# Patient Record
Sex: MALE | Race: ASIAN | NOT HISPANIC OR LATINO | Employment: UNEMPLOYED | URBAN - METROPOLITAN AREA
[De-identification: names, ages, dates, MRNs, and addresses within clinical notes are randomized per-mention and may not be internally consistent; named-entity substitution may affect disease eponyms.]

---

## 2023-06-16 ENCOUNTER — OFFICE VISIT (OUTPATIENT)
Dept: FAMILY MEDICINE | Facility: CLINIC | Age: 38
End: 2023-06-16
Payer: MEDICAID

## 2023-06-16 VITALS
HEIGHT: 63 IN | OXYGEN SATURATION: 99 % | SYSTOLIC BLOOD PRESSURE: 127 MMHG | RESPIRATION RATE: 18 BRPM | BODY MASS INDEX: 29.23 KG/M2 | WEIGHT: 165 LBS | DIASTOLIC BLOOD PRESSURE: 83 MMHG | HEART RATE: 102 BPM | TEMPERATURE: 98.7 F

## 2023-06-16 DIAGNOSIS — K92.1 MELENA: Primary | ICD-10-CM

## 2023-06-16 DIAGNOSIS — Z11.59 NEED FOR HEPATITIS C SCREENING TEST: ICD-10-CM

## 2023-06-16 DIAGNOSIS — Z11.4 SCREENING FOR HIV (HUMAN IMMUNODEFICIENCY VIRUS): ICD-10-CM

## 2023-06-16 DIAGNOSIS — R04.0 EPISTAXIS: ICD-10-CM

## 2023-06-16 LAB
ALBUMIN SERPL BCG-MCNC: 4.6 G/DL (ref 3.5–5.2)
ALP SERPL-CCNC: 70 U/L (ref 40–129)
ALT SERPL W P-5'-P-CCNC: 32 U/L (ref 0–70)
ANION GAP SERPL CALCULATED.3IONS-SCNC: 12 MMOL/L (ref 7–15)
AST SERPL W P-5'-P-CCNC: 35 U/L (ref 0–45)
BASOPHILS # BLD AUTO: 0.1 10E3/UL (ref 0–0.2)
BASOPHILS NFR BLD AUTO: 1 %
BILIRUB SERPL-MCNC: 0.2 MG/DL
BUN SERPL-MCNC: 10.9 MG/DL (ref 6–20)
CALCIUM SERPL-MCNC: 9.3 MG/DL (ref 8.6–10)
CHLORIDE SERPL-SCNC: 104 MMOL/L (ref 98–107)
CHOLEST SERPL-MCNC: 194 MG/DL
CREAT SERPL-MCNC: 0.76 MG/DL (ref 0.67–1.17)
DEPRECATED HCO3 PLAS-SCNC: 24 MMOL/L (ref 22–29)
EOSINOPHIL # BLD AUTO: 0.1 10E3/UL (ref 0–0.7)
EOSINOPHIL NFR BLD AUTO: 2 %
ERYTHROCYTE [DISTWIDTH] IN BLOOD BY AUTOMATED COUNT: 22 % (ref 10–15)
GFR SERPL CREATININE-BSD FRML MDRD: >90 ML/MIN/1.73M2
GLUCOSE SERPL-MCNC: 108 MG/DL (ref 70–99)
HCT VFR BLD AUTO: 23.6 % (ref 40–53)
HDLC SERPL-MCNC: 33 MG/DL
HGB BLD-MCNC: 6.3 G/DL (ref 13.3–17.7)
IMM GRANULOCYTES # BLD: 0 10E3/UL
IMM GRANULOCYTES NFR BLD: 0 %
INR PPP: 1.06 (ref 0.85–1.15)
LDLC SERPL CALC-MCNC: 89 MG/DL
LYMPHOCYTES # BLD AUTO: 1.3 10E3/UL (ref 0.8–5.3)
LYMPHOCYTES NFR BLD AUTO: 28 %
MCH RBC QN AUTO: 17.7 PG (ref 26.5–33)
MCHC RBC AUTO-ENTMCNC: 26.7 G/DL (ref 31.5–36.5)
MCV RBC AUTO: 66 FL (ref 78–100)
MONOCYTES # BLD AUTO: 0.6 10E3/UL (ref 0–1.3)
MONOCYTES NFR BLD AUTO: 13 %
NEUTROPHILS # BLD AUTO: 2.6 10E3/UL (ref 1.6–8.3)
NEUTROPHILS NFR BLD AUTO: 56 %
NONHDLC SERPL-MCNC: 161 MG/DL
PLATELET # BLD AUTO: 378 10E3/UL (ref 150–450)
POTASSIUM SERPL-SCNC: 4.1 MMOL/L (ref 3.4–5.3)
PROT SERPL-MCNC: 8.2 G/DL (ref 6.4–8.3)
RBC # BLD AUTO: 3.56 10E6/UL (ref 4.4–5.9)
SODIUM SERPL-SCNC: 140 MMOL/L (ref 136–145)
TRIGL SERPL-MCNC: 360 MG/DL
WBC # BLD AUTO: 4.7 10E3/UL (ref 4–11)

## 2023-06-16 PROCEDURE — 86706 HEP B SURFACE ANTIBODY: CPT | Performed by: STUDENT IN AN ORGANIZED HEALTH CARE EDUCATION/TRAINING PROGRAM

## 2023-06-16 PROCEDURE — 36415 COLL VENOUS BLD VENIPUNCTURE: CPT | Performed by: STUDENT IN AN ORGANIZED HEALTH CARE EDUCATION/TRAINING PROGRAM

## 2023-06-16 PROCEDURE — 85025 COMPLETE CBC W/AUTO DIFF WBC: CPT | Performed by: STUDENT IN AN ORGANIZED HEALTH CARE EDUCATION/TRAINING PROGRAM

## 2023-06-16 PROCEDURE — 80061 LIPID PANEL: CPT | Performed by: STUDENT IN AN ORGANIZED HEALTH CARE EDUCATION/TRAINING PROGRAM

## 2023-06-16 PROCEDURE — 85610 PROTHROMBIN TIME: CPT | Performed by: STUDENT IN AN ORGANIZED HEALTH CARE EDUCATION/TRAINING PROGRAM

## 2023-06-16 PROCEDURE — 80053 COMPREHEN METABOLIC PANEL: CPT | Performed by: STUDENT IN AN ORGANIZED HEALTH CARE EDUCATION/TRAINING PROGRAM

## 2023-06-16 PROCEDURE — T1013 SIGN LANG/ORAL INTERPRETER: HCPCS | Performed by: STUDENT IN AN ORGANIZED HEALTH CARE EDUCATION/TRAINING PROGRAM

## 2023-06-16 PROCEDURE — 99204 OFFICE O/P NEW MOD 45 MIN: CPT | Mod: GC | Performed by: STUDENT IN AN ORGANIZED HEALTH CARE EDUCATION/TRAINING PROGRAM

## 2023-06-16 PROCEDURE — 86803 HEPATITIS C AB TEST: CPT | Performed by: STUDENT IN AN ORGANIZED HEALTH CARE EDUCATION/TRAINING PROGRAM

## 2023-06-16 PROCEDURE — 87389 HIV-1 AG W/HIV-1&-2 AB AG IA: CPT | Performed by: STUDENT IN AN ORGANIZED HEALTH CARE EDUCATION/TRAINING PROGRAM

## 2023-06-16 NOTE — PROGRESS NOTES
"Preceptor attestation:  Vital signs reviewed: /83 (BP Location: Left arm, Patient Position: Sitting, Cuff Size: Adult Regular)   Pulse 102   Temp 98.7  F (37.1  C) (Tympanic)   Resp 18   Ht 1.6 m (5' 3\")   Wt 74.8 kg (165 lb)   SpO2 99%   BMI 29.23 kg/m      Patient seen, evaluated, and discussed with the resident.  I have verified the content of the note, which accurately reflects my assessment of the patient and the plan of care.    Supervising physician: Ana Maria Hoskins MD  [facility]  "

## 2023-06-16 NOTE — PROGRESS NOTES
Assessment & Plan     Screening for HIV (human immunodeficiency virus)  Establishing care with patient. Has no prior medical records. Episode of scleral icterus and skin yellowing, epistaxis, and melena of unknown origin 1 month ago.   - HIV Screening; Future  - HIV Screening    Need for hepatitis C screening test  Establishing care with patient. Has no prior medical records. Episode of scleral icterus and skin yellowing, epistaxis, melena, and fever of unknown origin 1 month ago. Patient does not know if he has been vaccinated or ever had a diagnosis of Hepatitis. No abdominal pain or hepatomegaly on exam.     Melena  Reports 2 weeks of dark brown blood in stool with episode of epistaxis and scleral icterus and skin yellowing. Was drinking hard alcohol before onset of symptoms. Not currently bleeding. No abdominal pain or hepatomegaly on exam.   - HIV Screening; Future  - Hepatitis C Screen Reflex to HCV RNA Quant and Genotype; Future  - Lipid panel reflex to direct LDL Non-fasting; Future  - CBC with Platelets & Differential; Future  - Comprehensive metabolic panel; Future  - Hepatitis B Surface Antibody; Future  - INR; Future  - HIV Screening  - Hepatitis C Screen Reflex to HCV RNA Quant and Genotype  - Lipid panel reflex to direct LDL Non-fasting  - CBC with Platelets & Differential  - Comprehensive metabolic panel  - Hepatitis B Surface Antibody  - INR    Epistaxis  Reports 1 month of epistaxis associated with scleral icterus and yellowing of the skin and melena of unknown origin. Was drinking hard alcohol prior to incident. No prior medical history and has not regularly sought care elsewhere. No abdominal pain or hepatomegaly on exam.   - HIV Screening; Future  - Hepatitis C Screen Reflex to HCV RNA Quant and Genotype; Future  - Lipid panel reflex to direct LDL Non-fasting; Future  - CBC with Platelets & Differential; Future  - Comprehensive metabolic panel; Future  - Hepatitis B Surface Antibody; Future  -  INR; Future  - HIV Screening  - Hepatitis C Screen Reflex to HCV RNA Quant and Genotype  - Lipid panel reflex to direct LDL Non-fasting  - CBC with Platelets & Differential  - Comprehensive metabolic panel  - Hepatitis B Surface Antibody  - INR    Addendum: Critical lab returned for patient Hgb 6.3, called patient over the phone with results and discussed that he should present to the emergency room. Patient would prefer to go to [facility] in [place] as he lives closest to there.    FUTURE APPOINTMENTS:       - Follow-up visit on June 21st, 2023 at 1:50 pm to go over lab work     Return in about 2 weeks (around 6/30/2023).    Madelnie Ashley, MS-3  Resident/Fellow Attestation   I, Go Diaz MD, was present with the medical/BARTOLO student who participated in the service and in the documentation of the note.  I have verified the history and personally performed the physical exam and medical decision making.  I agree with the assessment and plan of care as documented in the note.      Go Diaz MD  PGY3      Go Diaz MD  [facility]    Subjective   Lanette is a 37 year old, presenting for the following health issues:  RECHECK (Follow up headache and dizziness)         View : No data to display.              HPI Lanette is a 37 year old male here for dizziness. This started 1 month ago with a bloody nose and his skin and conjunctiva turning yellow. He also had a fever at that time. Prior to this, he had been drinking a lot of hard alcohol. Has not drinken since. He did not seek medical care at that time. His skin and eyes have gone back to their normal coloring but he is still getting dizzy at times with certain scents. No history of hepatitis. Unsure if he has been vaccinated against Hepatitis. Also reports a 2 week history of blood in the stool, dark red. Not currently bleeding.     No chills, shortness of breathe or abdominal pain. Some chest tightness with activity that is  "relieved by rest. Muscle aches after long bouts of activity in his legs.     This is the patients first time being seen here at the [facility]. He has not established care or had consistent medical care elsewhere. He has no known prior diagnoses. He does not take any medications.     Dizziness  Onset/Duration: 1 month ago bloody nose, skin turned yellow, HA and dizziness. Did not seek medical care. Now still experiencing HA and dizziness. Scleral icterus. Fever. No history of hepatitis. Only happens with scents--especially with cooking.       Description:   Do you feel faint: YES-- problems balancing   Does it feel like the surroundings (bed, room) are moving: No  Unsteady/off balance: No  Have you passed out or fallen: No  Progression of Symptoms: improving  Accompanying Signs & Symptoms:  Heart palpitations or chest pain: YES- feels chest tightness   Nausea, vomiting: No  Weakness or lack of coordination in arms or legs: Yes   Vision or speech changes: No  Numbness or tingling: No  Ringing in ears (Tinnitus): YES- in the morning when he wakes up, doesn't think related to dizziness   Hearing Loss: No  History:    Head trauma/concussion history: No  Previous similar symptoms: No  Recent bleeding history: YES- bloody nose & bowel movement (for 2 weeks now, dark red color)   Any new medications (BP?): No  Precipitating factors:   Worse with activity: No  Worse with head movement: No  Alleviating factors:   Does staying in a fixed position give relief: N/A   Therapies tried and outcome: \"traditional smell\"     Review of Systems   Constitutional, HEENT, cardiovascular, pulmonary, GI, , musculoskeletal, neuro, skin, endocrine and psych systems are negative, except as otherwise noted.      Objective    /83 (BP Location: Left arm, Patient Position: Sitting, Cuff Size: Adult Regular)   Pulse 102   Temp 98.7  F (37.1  C) (Tympanic)   Resp 18   Ht 1.6 m (5' 3\")   Wt 74.8 kg (165 lb)   SpO2 99%   BMI 29.23 " kg/m    Body mass index is 29.23 kg/m .  Physical Exam   GENERAL: healthy, alert and no distress  EYES: Eyes grossly normal to inspection, PERRL and conjunctivae and sclerae normal, no scleral icterus   RESP: lungs clear to auscultation - no rales, rhonchi or wheezes  CV: regular rate and rhythm, normal S1 S2, no S3 or S4, no murmur, click or rub, no peripheral edema and peripheral pulses strong  ABDOMEN: soft, nontender, no hepatosplenomegaly, no masses and bowel sounds normal  MS: no gross musculoskeletal defects noted, no edema  SKIN: no suspicious lesions or rashes, no yellowing of the skin   NEURO: Normal strength and tone, mentation intact and speech normal  PSYCH: mentation appears normal, affect normal/bright    Results for orders placed or performed in visit on 06/16/23 (from the past 24 hour(s))   CBC with Platelets & Differential    Narrative    The following orders were created for panel order CBC with Platelets & Differential.  Procedure                               Abnormality         Status                     ---------                               -----------         ------                     CBC with platelets and d...[[id number]]  Abnormal            Final result                 Please view results for these tests on the individual orders.   CBC with platelets and differential   Result Value Ref Range    WBC Count 4.7 4.0 - 11.0 10e3/uL    RBC Count 3.56 (L) 4.40 - 5.90 10e6/uL    Hemoglobin 6.3 (LL) 13.3 - 17.7 g/dL    Hematocrit 23.6 (L) 40.0 - 53.0 %    MCV 66 (L) 78 - 100 fL    MCH 17.7 (L) 26.5 - 33.0 pg    MCHC 26.7 (L) 31.5 - 36.5 g/dL    RDW 22.0 (H) 10.0 - 15.0 %    Platelet Count 378 150 - 450 10e3/uL    % Neutrophils 56 %    % Lymphocytes 28 %    % Monocytes 13 %    % Eosinophils 2 %    % Basophils 1 %    % Immature Granulocytes 0 %    Absolute Neutrophils 2.6 1.6 - 8.3 10e3/uL    Absolute Lymphocytes 1.3 0.8 - 5.3 10e3/uL    Absolute Monocytes 0.6 0.0 - 1.3 10e3/uL    Absolute  Eosinophils 0.1 0.0 - 0.7 10e3/uL    Absolute Basophils 0.1 0.0 - 0.2 10e3/uL    Absolute Immature Granulocytes 0.0 <=0.4 10e3/uL       ----- Service Performed and Documented by Resident or Fellow ------

## 2023-06-17 LAB
HBV SURFACE AB SERPL IA-ACNC: >1000 M[IU]/ML
HBV SURFACE AB SERPL IA-ACNC: REACTIVE M[IU]/ML
HCV AB SERPL QL IA: NONREACTIVE
HIV 1+2 AB+HIV1 P24 AG SERPL QL IA: NONREACTIVE

## 2023-06-21 ENCOUNTER — HOSPITAL ENCOUNTER (OUTPATIENT)
Facility: HOSPITAL | Age: 38
Setting detail: OBSERVATION
Discharge: HOME | End: 2023-06-22
Attending: EMERGENCY MEDICINE | Admitting: FAMILY MEDICINE
Payer: MEDICAID

## 2023-06-21 ENCOUNTER — OFFICE VISIT (OUTPATIENT)
Dept: FAMILY MEDICINE | Facility: CLINIC | Age: 38
End: 2023-06-21
Payer: MEDICAID

## 2023-06-21 VITALS
SYSTOLIC BLOOD PRESSURE: 114 MMHG | RESPIRATION RATE: 16 BRPM | OXYGEN SATURATION: 99 % | HEART RATE: 88 BPM | BODY MASS INDEX: 28.87 KG/M2 | WEIGHT: 163 LBS | TEMPERATURE: 98.4 F | DIASTOLIC BLOOD PRESSURE: 76 MMHG

## 2023-06-21 DIAGNOSIS — K92.1 MELENA: ICD-10-CM

## 2023-06-21 DIAGNOSIS — D64.9 SEVERE ANEMIA: Primary | ICD-10-CM

## 2023-06-21 DIAGNOSIS — D64.9 ANEMIA, UNSPECIFIED TYPE: ICD-10-CM

## 2023-06-21 LAB
ABO/RH(D): NORMAL
ALBUMIN SERPL BCG-MCNC: 4.4 G/DL (ref 3.5–5.2)
ALP SERPL-CCNC: 71 U/L (ref 40–129)
ALT SERPL W P-5'-P-CCNC: 26 U/L (ref 0–70)
ANION GAP SERPL CALCULATED.3IONS-SCNC: 11 MMOL/L (ref 7–15)
ANTIBODY SCREEN: NEGATIVE
AST SERPL W P-5'-P-CCNC: 24 U/L (ref 0–45)
BASOPHILS # BLD AUTO: 0.1 10E3/UL (ref 0–0.2)
BASOPHILS # BLD AUTO: 0.1 10E3/UL (ref 0–0.2)
BASOPHILS NFR BLD AUTO: 1 %
BASOPHILS NFR BLD AUTO: 1 %
BILIRUB SERPL-MCNC: 0.3 MG/DL
BLD PROD TYP BPU: NORMAL
BLOOD COMPONENT TYPE: NORMAL
BUN SERPL-MCNC: 16.7 MG/DL (ref 6–20)
CALCIUM SERPL-MCNC: 9.6 MG/DL (ref 8.6–10)
CHLORIDE SERPL-SCNC: 103 MMOL/L (ref 98–107)
CODING SYSTEM: NORMAL
CREAT SERPL-MCNC: 1.05 MG/DL (ref 0.67–1.17)
CROSSMATCH: NORMAL
DEPRECATED HCO3 PLAS-SCNC: 24 MMOL/L (ref 22–29)
EOSINOPHIL # BLD AUTO: 0.1 10E3/UL (ref 0–0.7)
EOSINOPHIL # BLD AUTO: 0.1 10E3/UL (ref 0–0.7)
EOSINOPHIL NFR BLD AUTO: 2 %
EOSINOPHIL NFR BLD AUTO: 2 %
ERYTHROCYTE [DISTWIDTH] IN BLOOD BY AUTOMATED COUNT: 22.7 % (ref 10–15)
ERYTHROCYTE [DISTWIDTH] IN BLOOD BY AUTOMATED COUNT: 23.3 % (ref 10–15)
GFR SERPL CREATININE-BSD FRML MDRD: >90 ML/MIN/1.73M2
GLUCOSE SERPL-MCNC: 138 MG/DL (ref 70–99)
HCT VFR BLD AUTO: 23.7 % (ref 40–53)
HCT VFR BLD AUTO: 25.3 % (ref 40–53)
HGB BLD-MCNC: 6.1 G/DL (ref 13.3–17.7)
HGB BLD-MCNC: 6.5 G/DL (ref 13.3–17.7)
IMM GRANULOCYTES # BLD: 0 10E3/UL
IMM GRANULOCYTES # BLD: 0 10E3/UL
IMM GRANULOCYTES NFR BLD: 0 %
IMM GRANULOCYTES NFR BLD: 0 %
ISSUE DATE AND TIME: NORMAL
LYMPHOCYTES # BLD AUTO: 1.4 10E3/UL (ref 0.8–5.3)
LYMPHOCYTES # BLD AUTO: 1.7 10E3/UL (ref 0.8–5.3)
LYMPHOCYTES NFR BLD AUTO: 24 %
LYMPHOCYTES NFR BLD AUTO: 26 %
MCH RBC QN AUTO: 17.1 PG (ref 26.5–33)
MCH RBC QN AUTO: 17.3 PG (ref 26.5–33)
MCHC RBC AUTO-ENTMCNC: 25.7 G/DL (ref 31.5–36.5)
MCHC RBC AUTO-ENTMCNC: 25.7 G/DL (ref 31.5–36.5)
MCV RBC AUTO: 67 FL (ref 78–100)
MCV RBC AUTO: 68 FL (ref 78–100)
MONOCYTES # BLD AUTO: 0.7 10E3/UL (ref 0–1.3)
MONOCYTES # BLD AUTO: 0.9 10E3/UL (ref 0–1.3)
MONOCYTES NFR BLD AUTO: 11 %
MONOCYTES NFR BLD AUTO: 14 %
NEUTROPHILS # BLD AUTO: 3.7 10E3/UL (ref 1.6–8.3)
NEUTROPHILS # BLD AUTO: 3.8 10E3/UL (ref 1.6–8.3)
NEUTROPHILS NFR BLD AUTO: 57 %
NEUTROPHILS NFR BLD AUTO: 62 %
NRBC # BLD AUTO: 0 10E3/UL
NRBC # BLD AUTO: 0 10E3/UL
NRBC BLD AUTO-RTO: 0 /100
NRBC BLD AUTO-RTO: 0 /100
PLATELET # BLD AUTO: 358 10E3/UL (ref 150–450)
PLATELET # BLD AUTO: 392 10E3/UL (ref 150–450)
POTASSIUM SERPL-SCNC: 3.9 MMOL/L (ref 3.4–5.3)
PROT SERPL-MCNC: 7.9 G/DL (ref 6.4–8.3)
RBC # BLD AUTO: 3.56 10E6/UL (ref 4.4–5.9)
RBC # BLD AUTO: 3.75 10E6/UL (ref 4.4–5.9)
SODIUM SERPL-SCNC: 138 MMOL/L (ref 136–145)
SPECIMEN EXPIRATION DATE: NORMAL
UNIT ABO/RH: NORMAL
UNIT NUMBER: NORMAL
UNIT STATUS: NORMAL
UNIT TYPE ISBT: 9500
WBC # BLD AUTO: 6 10E3/UL (ref 4–11)
WBC # BLD AUTO: 6.7 10E3/UL (ref 4–11)

## 2023-06-21 PROCEDURE — 99214 OFFICE O/P EST MOD 30 MIN: CPT | Mod: GC | Performed by: STUDENT IN AN ORGANIZED HEALTH CARE EDUCATION/TRAINING PROGRAM

## 2023-06-21 PROCEDURE — 80053 COMPREHEN METABOLIC PANEL: CPT | Performed by: EMERGENCY MEDICINE

## 2023-06-21 PROCEDURE — 36430 TRANSFUSION BLD/BLD COMPNT: CPT

## 2023-06-21 PROCEDURE — 99285 EMERGENCY DEPT VISIT HI MDM: CPT | Mod: 25

## 2023-06-21 PROCEDURE — 36415 COLL VENOUS BLD VENIPUNCTURE: CPT | Performed by: EMERGENCY MEDICINE

## 2023-06-21 PROCEDURE — G0378 HOSPITAL OBSERVATION PER HR: HCPCS

## 2023-06-21 PROCEDURE — 85018 HEMOGLOBIN: CPT | Mod: 91 | Performed by: STUDENT IN AN ORGANIZED HEALTH CARE EDUCATION/TRAINING PROGRAM

## 2023-06-21 PROCEDURE — 36415 COLL VENOUS BLD VENIPUNCTURE: CPT | Performed by: STUDENT IN AN ORGANIZED HEALTH CARE EDUCATION/TRAINING PROGRAM

## 2023-06-21 PROCEDURE — 86901 BLOOD TYPING SEROLOGIC RH(D): CPT | Performed by: EMERGENCY MEDICINE

## 2023-06-21 PROCEDURE — 85025 COMPLETE CBC W/AUTO DIFF WBC: CPT | Performed by: STUDENT IN AN ORGANIZED HEALTH CARE EDUCATION/TRAINING PROGRAM

## 2023-06-21 PROCEDURE — 250N000011 HC RX IP 250 OP 636: Mod: JZ | Performed by: EMERGENCY MEDICINE

## 2023-06-21 PROCEDURE — 96375 TX/PRO/DX INJ NEW DRUG ADDON: CPT

## 2023-06-21 PROCEDURE — C9113 INJ PANTOPRAZOLE SODIUM, VIA: HCPCS | Mod: JZ | Performed by: EMERGENCY MEDICINE

## 2023-06-21 PROCEDURE — 86923 COMPATIBILITY TEST ELECTRIC: CPT | Performed by: EMERGENCY MEDICINE

## 2023-06-21 PROCEDURE — 85025 COMPLETE CBC W/AUTO DIFF WBC: CPT | Performed by: EMERGENCY MEDICINE

## 2023-06-21 PROCEDURE — P9016 RBC LEUKOCYTES REDUCED: HCPCS | Performed by: EMERGENCY MEDICINE

## 2023-06-21 PROCEDURE — 86850 RBC ANTIBODY SCREEN: CPT | Performed by: EMERGENCY MEDICINE

## 2023-06-21 RX ORDER — ONDANSETRON 2 MG/ML
4 INJECTION INTRAMUSCULAR; INTRAVENOUS EVERY 6 HOURS PRN
Status: DISCONTINUED | OUTPATIENT
Start: 2023-06-21 | End: 2023-06-22 | Stop reason: HOSPADM

## 2023-06-21 RX ORDER — LIDOCAINE 40 MG/G
CREAM TOPICAL
Status: DISCONTINUED | OUTPATIENT
Start: 2023-06-21 | End: 2023-06-22 | Stop reason: HOSPADM

## 2023-06-21 RX ORDER — ONDANSETRON 4 MG/1
4 TABLET, ORALLY DISINTEGRATING ORAL EVERY 6 HOURS PRN
Status: DISCONTINUED | OUTPATIENT
Start: 2023-06-21 | End: 2023-06-22 | Stop reason: HOSPADM

## 2023-06-21 RX ORDER — LIDOCAINE 40 MG/G
CREAM TOPICAL
Status: DISCONTINUED | OUTPATIENT
Start: 2023-06-21 | End: 2023-06-22

## 2023-06-21 RX ADMIN — PANTOPRAZOLE SODIUM 80 MG: 40 INJECTION, POWDER, FOR SOLUTION INTRAVENOUS at 20:50

## 2023-06-21 ASSESSMENT — ACTIVITIES OF DAILY LIVING (ADL)
ADLS_ACUITY_SCORE: 35
ADLS_ACUITY_SCORE: 33
ADLS_ACUITY_SCORE: 35
ADLS_ACUITY_SCORE: 35

## 2023-06-21 ASSESSMENT — ENCOUNTER SYMPTOMS
BLOOD IN STOOL: 0
ABDOMINAL PAIN: 0
LIGHT-HEADEDNESS: 1
FATIGUE: 1

## 2023-06-21 NOTE — ED TRIAGE NOTES
Patient comes in via clinic for low hbg. Patient reports feeling very tried and run down. Patient is Mikayla speaking and speaks no english.

## 2023-06-21 NOTE — LETTER
RETURN TO WORK/SCHOOL FORM    6/21/2023    Re: Lanette Subramanian  1985      To Whom It May Concern:     Lanette Subramanian contacted the clinic today and reported illness and has severe anemia. I recommended that he present to the Emergency Department today see note.      Go Diaz MD  6/21/2023 2:19 PM

## 2023-06-21 NOTE — ED PROVIDER NOTES
EMERGENCY DEPARTMENT ENCOUNTER      NAME: Lanette Subramanian  AGE: 37 year old male  YOB: 1985  MRN: [id number]  EVALUATION DATE & TIME: 2023  4:23 PM    PCP: Ruby Kimbrough    ED PROVIDER: Delmis Dean DO      Chief Complaint   Patient presents with     Abnormal Labs         FINAL IMPRESSION:  1. Anemia, unspecified type    2. Melena          ED COURSE & MEDICAL DECISION MAKIN-year-old male was sent into the ED for evaluation of anemia and melena.  The patient was found to be hemoglobin of 6.3 on 2023.  The patient was instructed to be seen in the ED at that time.,  The patient did not arrive until today.  The patient reports he said history of melanoma.  However, he states that the melena has since resolved over the last few weeks.  The patient reports feeling fatigued and dizzy at times.  The patient had no reports of any abdominal pain, abnormal bleeding or bruising, or any other symptoms.  Here in the ED the patient's heart rate was 100 upon arrival.  Patient was otherwise hemodynamically stable.  He did not appear to be in any obvious distress or discomfort and he was not acutely ill-appearing at the time of his initial evaluation.  The patient's physical exam was unremarkable.       Patient's hemoglobin today in the ED was 6.1.  Remainder of the CBC as well as the CMP were both unremarkable.  The patient was typed and screened and a unit of packed red blood cells were ordered after consent was obtained.    The patient was transfused 1 unit of packed red blood cells here in the ED.  The patient's case was discussed with the on-call gastroenterologist who recommended admission for upper endoscopy in the a.m.       With the help of the Mikayla  the patient was informed that he will be admitted and will likely undergo endoscopy in the a.m.      Pertinent Labs & Imaging studies reviewed. (See chart for details)  4:43 PM I met with the patient to gather history and to perform my initial  exam. We discussed plans for the ED course, including diagnostic testing and treatment.   7:15 PM Discussed the case with Dr. Mcnamara with [facility], who recommends admitting the patient and that they will see him tomorrow and likely conduct an upper endoscopy.   7:33 PM I rechecked and updated the patient, using , on results and plan of admission. Patient is agreeable at this time.       At the conclusion of the encounter I discussed the results of all of the tests and the disposition. The questions were answered. The patient or family acknowledged understanding and was agreeable with the care plan.     Medical Decision Making    History:    Supplemental history from: Documented in chart, if applicable    External Record(s) reviewed: Documented in chart, if applicable., Outpatient Record: Office visits to PCP on 06/16 and 06/21  and Prior Labs: Labs from both PCP visits    Work Up:    Chart documentation includes differential considered and any EKGs or imaging independently interpreted by provider, where specified.    In additional to work up documented, I considered the following work up: Documented in chart, if applicable.    External consultation:    Discussion of management with another provider: Documented in chart, if applicable    Complicating factors:    Care impacted by chronic illness: N/A    Care affected by social determinants of health: N/A    Disposition considerations: Admit.        Critical Care  Performed by: Delmis Dean DO  Authorized by: Delmis Dean DO     Total critical care time: 30 minutes  Critical care time was exclusive of separately billable procedures and treating other patients.  Critical care was necessary to treat or prevent imminent or life-threatening deterioration of the following conditions: Anemia requiring blood transfusion  Critical care was time spent personally by me on the following activities: development of treatment plan with patient or surrogate, discussions with  consultants, examination of patient, evaluation of patient's response to treatment, obtaining history from patient or surrogate, ordering and performing treatments and interventions, ordering and review of laboratory studies, ordering and review of radiographic studies and re-evaluation of patient's condition, this excludes any separately billable procedures.    PPE worn: n95 mask, goggles    MEDICATIONS GIVEN IN THE EMERGENCY:  Medications   lidocaine 1 % 0.1-1 mL (has no administration in time range)   lidocaine (LMX4) cream (has no administration in time range)   sodium chloride (PF) 0.9% PF flush 3 mL (3 mLs Intracatheter $Given 6/21/23 1709)   sodium chloride (PF) 0.9% PF flush 3 mL (has no administration in time range)   pantoprazole (PROTONIX) IV push injection 40 mg (has no administration in time range)   lidocaine 1 % 0.1-1 mL (has no administration in time range)   lidocaine (LMX4) cream (has no administration in time range)   sodium chloride (PF) 0.9% PF flush 3 mL (has no administration in time range)   sodium chloride (PF) 0.9% PF flush 3 mL (has no administration in time range)   melatonin tablet 1 mg (has no administration in time range)   ondansetron (ZOFRAN ODT) ODT tab 4 mg (has no administration in time range)     Or   ondansetron (ZOFRAN) injection 4 mg (has no administration in time range)   nicotine Patch in Place (has no administration in time range)   nicotine (NICODERM CQ) 7 MG/24HR 24 hr patch 1 patch (has no administration in time range)   pantoprazole (PROTONIX) IV push injection 80 mg (80 mg Intravenous $Given 6/21/23 2050)       NEW PRESCRIPTIONS STARTED AT TODAY'S ER VISIT  New Prescriptions    No medications on file          =================================================================    HPI    Patient information was obtained from: Patient    Use of : Yes (Phone) - Language Mikayla Subramanian is a 37 year old male with a pertinent history of melena who presents to  this ED ambulatory for evaluation of abnormal labs.     As per patient chart review, the patient was seen at the [facility] on 06/16 to establish care. At the time, the patient reported an episode of scleral icterus, skin yellowing, epistaxis, melena and fever of unknown origin 1 month prior. He endorsed 2 weeks of dark brown blood in stool. He notes drinking hard alcohol prior to the onset of symptoms. Patient's blood work indicated critical results with Hgb of 6.3, and patient was told to come to the ER.     Chart review, indicates patient was seen again on 06/21 by his PCP at [facility]. Patient reported occasional dizziness, but otherwise feels fine. Explained to patient that he needed to go to the ER for a blood transfusion due to his anemia. Repeat CBC with same result as last week of 6.3    Patient confirms that he was seen last week and was found to be anemic, he states he did not go the ER then because he did not know where to go. Explains that he was seen earlier today and again was told to come to the ER for a transfusion. Patient endorses fatigue and dizziness ongoing for more than a month. He reports that the melena has resolved. HE denies any abdominal pain, bloody emesis, or easy bruising.     Patient states he last drank alcohol 1 month ago, and at the time would drink ~1/week.           REVIEW OF SYSTEMS   Review of Systems   Constitutional: Positive for fatigue.   Gastrointestinal: Negative for abdominal pain and blood in stool.        Negative blood emesis   Neurological: Positive for light-headedness.   All other systems reviewed and are negative.       PAST MEDICAL HISTORY:  No past medical history on file.    PAST SURGICAL HISTORY:  No past surgical history on file.        CURRENT MEDICATIONS:    No current outpatient medications on file.      ALLERGIES:  No Known Allergies    FAMILY HISTORY:  No family history on file.    SOCIAL HISTORY:   Social History  "    Socioeconomic History     Marital status: Single   Tobacco Use     Smoking status: Never     Smokeless tobacco: Never       VITALS:  /78   Pulse 74   Temp 98.8  F (37.1  C) (Oral)   Resp 16   Ht 1.6 m (5' 3\")   Wt 73.9 kg (163 lb)   SpO2 99%   BMI 28.87 kg/m      PHYSICAL EXAM    General presentation: Alert, Vital signs reviewed. NAD  HENT: ENT inspection is normal. Oropharynx is moist and clear.   Eye: Pupils are equal and reactive to light. EOMI  Neck: The neck is supple, with full ROM, with no evidence of meningismus.  Pulmonary: Currently in no acute respiratory distress. Normal, non labored respirations, the lung sounds are normal with good equal air movement. Clear to auscultation bilaterally.   Circulatory: Regular rate and rhythm. Peripheral pulses are strong and equal. No murmurs, rubs, or gallops.   Abdominal: The abdomen is soft. Nontender. No rigidity, guarding, or rebound. Bowel sounds normal.   Neurologic: Alert, oriented to person, place, and time. No motor deficit. No sensory deficit. Cranial nerves II through XII are intact.  Musculoskeletal: No extremity tenderness. Full range of motion in all extremities. No extremity edema.   Skin: Skin color is normal. No rash. Warm. Dry to touch.      LAB:  All pertinent labs reviewed and interpreted.  Results for orders placed or performed during the hospital encounter of 06/21/23   Comprehensive metabolic panel   Result Value Ref Range    Sodium 138 136 - 145 mmol/L    Potassium 3.9 3.4 - 5.3 mmol/L    Chloride 103 98 - 107 mmol/L    Carbon Dioxide (CO2) 24 22 - 29 mmol/L    Anion Gap 11 7 - 15 mmol/L    Urea Nitrogen 16.7 6.0 - 20.0 mg/dL    Creatinine 1.05 0.67 - 1.17 mg/dL    Calcium 9.6 8.6 - 10.0 mg/dL    Glucose 138 (H) 70 - 99 mg/dL    Alkaline Phosphatase 71 40 - 129 U/L    AST 24 0 - 45 U/L    ALT 26 0 - 70 U/L    Protein Total 7.9 6.4 - 8.3 g/dL    Albumin 4.4 3.5 - 5.2 g/dL    Bilirubin Total 0.3 <=1.2 mg/dL    GFR Estimate >90 >60 " mL/min/1.73m2   CBC with platelets and differential   Result Value Ref Range    WBC Count 6.0 4.0 - 11.0 10e3/uL    RBC Count 3.56 (L) 4.40 - 5.90 10e6/uL    Hemoglobin 6.1 (LL) 13.3 - 17.7 g/dL    Hematocrit 23.7 (L) 40.0 - 53.0 %    MCV 67 (L) 78 - 100 fL    MCH 17.1 (L) 26.5 - 33.0 pg    MCHC 25.7 (L) 31.5 - 36.5 g/dL    RDW 22.7 (H) 10.0 - 15.0 %    Platelet Count 358 150 - 450 10e3/uL    % Neutrophils 62 %    % Lymphocytes 24 %    % Monocytes 11 %    % Eosinophils 2 %    % Basophils 1 %    % Immature Granulocytes 0 %    NRBCs per 100 WBC 0 <1 /100    Absolute Neutrophils 3.7 1.6 - 8.3 10e3/uL    Absolute Lymphocytes 1.4 0.8 - 5.3 10e3/uL    Absolute Monocytes 0.7 0.0 - 1.3 10e3/uL    Absolute Eosinophils 0.1 0.0 - 0.7 10e3/uL    Absolute Basophils 0.1 0.0 - 0.2 10e3/uL    Absolute Immature Granulocytes 0.0 <=0.4 10e3/uL    Absolute NRBCs 0.0 10e3/uL   Adult Type and Screen   Result Value Ref Range    ABO/RH(D) O POS     Antibody Screen Negative Negative    SPECIMEN EXPIRATION DATE [id number]    Prepare red blood cells (unit)   Result Value Ref Range    Blood Component Type Red Blood Cells     Product Code [id number]     Unit Status Transfused     Unit Number [id number]     CROSSMATCH Compatible     CODING SYSTEM [id number]     ISSUE DATE AND TIME [id number]     UNIT ABO/RH O-     UNIT TYPE ISBT 9500        RADIOLOGY:  Reviewed all pertinent imaging. Please see official radiology report.  No orders to display       Kasandra BOX , am serving as a scribe to document services personally performed by Delmis Dean DO based on my observation and the provider's statements to me. I, Delmis Dean, attest that Kasandra Stearns is acting in a scribe capacity, has observed my performance of the services and has documented them in accordance with my direction.    Delmis Dean DO  Emergency Medicine  [facility] EMERGENCY DEPARTMENT  [facility] [facility] [id number]  [phone]       Dianne  Delmis Hester,   06/21/23 2146

## 2023-06-21 NOTE — PROGRESS NOTES
Assessment & Plan     Severe anemia  Vital signs are within normal limits, patient is hemodynamically stable with normal BP and HR. Other than occasional dizziness he feels fine.  I discussed with him that the emergency department is still the safest place for him to have further work up and his anemia would qualify for blood transfusion. He also likely needs endoscopic work-up with GI given his history of melena as well as a comprehensive work-up for his anemia, some additional lab work was added.  This is likely longstanding and chronic, we repeated a CBC today that showed anemia with a hemoglobin at 6.3, same as last week.  After discussion with him he ultimately agreed to present to the emergency department for further evaluation.  Recommended close follow-up within 1 week, if he does not get endoscopy inpatient then would recommend that he follow-up closely outpatient for outpatient endoscopy, and likely outpatient iron infusions.  - Adult GI  Referral - Procedure Only; Future  - Iron & Iron Binding Capacity; Future  - Ferritin; Future  - Transferrin; Future  - Hemoglobinopathy/Thal Rex; Future  - Vitamin B12; Future  - Folate  Serum; Future  - Lab Blood Morphology Pathologist Review; Future    Melena  Repeated CBC, hemoglobin 6.3.  - CBC with Platelets & Differential; Future  - CBC with Platelets & Differential    Return for Present to the Emergency Department.    Go Diaz MD  [facility]    Subjective   Lanette is a 37 year old, presenting for the following health issues:  Anemia (Pt is here for F/U on anemia and states he hasn't gone to Er cause don't know where to go )        6/16/2023     1:32 PM   Additional Questions   Roomed by shoua   Accompanied by self     HPI     Lanette is a 37 year old male who presented a week ago for dizziness. States that over 1 month ago patient had a bloody nose and history of blood in the stool, dark red. His lab work showed severe anemia to  6.3 and I recommended he go to the Emergency Department. He states initially that he felt fine and did not think he needed to go to the ED. He is still getting dizzy at times, denies chills, headaches, chest pain, shortness of breathe or abdominal pain.     Review of Systems   Constitutional, HEENT, cardiovascular, pulmonary, gi and gu systems are negative, except as otherwise noted.      Objective    /76 (BP Location: Left arm, Patient Position: Sitting, Cuff Size: Adult Regular)   Pulse 88   Temp 98.4  F (36.9  C) (Oral)   Resp 16   Wt 73.9 kg (163 lb)   SpO2 99%   BMI 28.87 kg/m    Body mass index is 28.87 kg/m .  Physical Exam   GENERAL: healthy, alert and no distress  EYES: Eyes grossly normal to inspection, PERRL and conjunctivae and sclerae normal  HENT: ear canals and TM's normal, nose and mouth without ulcers or lesions  NECK: no adenopathy, no asymmetry, masses, or scars and thyroid normal to palpation  RESP: lungs clear to auscultation - no rales, rhonchi or wheezes  CV: regular rate and rhythm, normal S1 S2, no S3 or S4, no murmur, click or rub, no peripheral edema and peripheral pulses strong  ABDOMEN: soft, nontender, no hepatosplenomegaly, no masses and bowel sounds normal  MS: no gross musculoskeletal defects noted, no edema  SKIN: no suspicious lesions or rashes  NEURO: Normal strength and tone, mentation intact and speech normal  PSYCH: mentation appears normal, affect normal/bright    Results for orders placed or performed in visit on 06/21/23   CBC with Platelets & Differential     Status: None (In process)    Narrative    The following orders were created for panel order CBC with Platelets & Differential.  Procedure                               Abnormality         Status                     ---------                               -----------         ------                     CBC with platelets and d...[[id number]]                      In process                   Please view results  for these tests on the individual orders.       ----- Service Performed and Documented by Resident or Fellow ------

## 2023-06-21 NOTE — PATIENT INSTRUCTIONS
[facility]  [address] [place] [address].      06/22/23   GASTROENTEROLOGY REFERRAL  [facility]  Phone [phone]  Fax: [phone]    Online referral placed with [facility] who will contact patient to schedule.     Donna Singh

## 2023-06-21 NOTE — PROGRESS NOTES
Preceptor Attestation:    I discussed the patient with the resident and evaluated the patient in person. I have verified the content of the note, which accurately reflects my assessment of the patient and the plan of care.   Supervising Physician:  Bala Donis MD.

## 2023-06-22 VITALS
RESPIRATION RATE: 14 BRPM | DIASTOLIC BLOOD PRESSURE: 75 MMHG | OXYGEN SATURATION: 98 % | HEART RATE: 72 BPM | BODY MASS INDEX: 28.88 KG/M2 | HEIGHT: 63 IN | TEMPERATURE: 97.9 F | SYSTOLIC BLOOD PRESSURE: 109 MMHG | WEIGHT: 163 LBS

## 2023-06-22 LAB
ALBUMIN SERPL BCG-MCNC: 4.3 G/DL (ref 3.5–5.2)
ALP SERPL-CCNC: 59 U/L (ref 40–129)
ALT SERPL W P-5'-P-CCNC: 26 U/L (ref 0–70)
ANION GAP SERPL CALCULATED.3IONS-SCNC: 8 MMOL/L (ref 7–15)
AST SERPL W P-5'-P-CCNC: 21 U/L (ref 0–45)
BILIRUB SERPL-MCNC: 0.6 MG/DL
BUN SERPL-MCNC: 15.8 MG/DL (ref 6–20)
CALCIUM SERPL-MCNC: 9.2 MG/DL (ref 8.6–10)
CHLORIDE SERPL-SCNC: 105 MMOL/L (ref 98–107)
CREAT SERPL-MCNC: 0.83 MG/DL (ref 0.67–1.17)
DEPRECATED HCO3 PLAS-SCNC: 26 MMOL/L (ref 22–29)
ERYTHROCYTE [DISTWIDTH] IN BLOOD BY AUTOMATED COUNT: 24.3 % (ref 10–15)
GFR SERPL CREATININE-BSD FRML MDRD: >90 ML/MIN/1.73M2
GLUCOSE SERPL-MCNC: 95 MG/DL (ref 70–99)
HCT VFR BLD AUTO: 26.9 % (ref 40–53)
HGB BLD-MCNC: 7.3 G/DL (ref 13.3–17.7)
HGB BLD-MCNC: 7.4 G/DL (ref 13.3–17.7)
HGB BLD-MCNC: 7.6 G/DL (ref 13.3–17.7)
HOLD SPECIMEN: NORMAL
MCH RBC QN AUTO: 18.7 PG (ref 26.5–33)
MCHC RBC AUTO-ENTMCNC: 27.1 G/DL (ref 31.5–36.5)
MCV RBC AUTO: 69 FL (ref 78–100)
PLATELET # BLD AUTO: 318 10E3/UL (ref 150–450)
POTASSIUM SERPL-SCNC: 4.2 MMOL/L (ref 3.4–5.3)
PROT SERPL-MCNC: 7.6 G/DL (ref 6.4–8.3)
RBC # BLD AUTO: 3.91 10E6/UL (ref 4.4–5.9)
SODIUM SERPL-SCNC: 139 MMOL/L (ref 136–145)
WBC # BLD AUTO: 5.3 10E3/UL (ref 4–11)

## 2023-06-22 PROCEDURE — G0378 HOSPITAL OBSERVATION PER HR: HCPCS

## 2023-06-22 PROCEDURE — 96365 THER/PROPH/DIAG IV INF INIT: CPT

## 2023-06-22 PROCEDURE — 96366 THER/PROPH/DIAG IV INF ADDON: CPT

## 2023-06-22 PROCEDURE — 85027 COMPLETE CBC AUTOMATED: CPT | Performed by: STUDENT IN AN ORGANIZED HEALTH CARE EDUCATION/TRAINING PROGRAM

## 2023-06-22 PROCEDURE — C9113 INJ PANTOPRAZOLE SODIUM, VIA: HCPCS | Performed by: STUDENT IN AN ORGANIZED HEALTH CARE EDUCATION/TRAINING PROGRAM

## 2023-06-22 PROCEDURE — 250N000011 HC RX IP 250 OP 636: Mod: JZ | Performed by: STUDENT IN AN ORGANIZED HEALTH CARE EDUCATION/TRAINING PROGRAM

## 2023-06-22 PROCEDURE — 36415 COLL VENOUS BLD VENIPUNCTURE: CPT | Performed by: STUDENT IN AN ORGANIZED HEALTH CARE EDUCATION/TRAINING PROGRAM

## 2023-06-22 PROCEDURE — 80053 COMPREHEN METABOLIC PANEL: CPT | Performed by: STUDENT IN AN ORGANIZED HEALTH CARE EDUCATION/TRAINING PROGRAM

## 2023-06-22 PROCEDURE — 96376 TX/PRO/DX INJ SAME DRUG ADON: CPT

## 2023-06-22 PROCEDURE — 250N000011 HC RX IP 250 OP 636: Performed by: STUDENT IN AN ORGANIZED HEALTH CARE EDUCATION/TRAINING PROGRAM

## 2023-06-22 PROCEDURE — 99223 1ST HOSP IP/OBS HIGH 75: CPT | Mod: GC | Performed by: STUDENT IN AN ORGANIZED HEALTH CARE EDUCATION/TRAINING PROGRAM

## 2023-06-22 PROCEDURE — 258N000003 HC RX IP 258 OP 636: Performed by: STUDENT IN AN ORGANIZED HEALTH CARE EDUCATION/TRAINING PROGRAM

## 2023-06-22 PROCEDURE — 85018 HEMOGLOBIN: CPT | Mod: 91 | Performed by: STUDENT IN AN ORGANIZED HEALTH CARE EDUCATION/TRAINING PROGRAM

## 2023-06-22 RX ORDER — PANTOPRAZOLE SODIUM 40 MG/1
40 TABLET, DELAYED RELEASE ORAL DAILY
Qty: 30 TABLET | Refills: 0 | Status: SHIPPED | OUTPATIENT
Start: 2023-06-22

## 2023-06-22 RX ORDER — ACETAMINOPHEN 500 MG
1000 TABLET ORAL EVERY 6 HOURS PRN
COMMUNITY

## 2023-06-22 RX ADMIN — IRON SUCROSE 300 MG: 20 INJECTION, SOLUTION INTRAVENOUS at 15:27

## 2023-06-22 RX ADMIN — PANTOPRAZOLE SODIUM 40 MG: 40 INJECTION, POWDER, FOR SOLUTION INTRAVENOUS at 09:05

## 2023-06-22 ASSESSMENT — ACTIVITIES OF DAILY LIVING (ADL)
ADLS_ACUITY_SCORE: 35

## 2023-06-22 NOTE — H&P
"    [facility]    History and Physical - Hospitalist Service       Date of Admission:  6/21/2023    Assessment & Plan      Lanette Subramanian is a 37 year old male admitted on 6/21/2023. He has a history of gastric ulcer and is admitted for likely UGIB.    Melena  Likely UGIB  Acute blood loss anemia  Patient reports history of bleeding gastric ulcer causing similar symptoms in 2020.  Denies H. pylori history.  Has had melena for about a month, now with fatigue, dizziness. Hgb 6.1 on presentation. S/p 1 unit pRBCs.   Also with reported jaundice and scleral icterus, though none appreciated on exam. Doubt true jaundice given normal bilirubin both here and at PCP clinic. Additionally notes intermittent subjective fevers during this time, though none measured; may be a red herring given no stigmata of infxn. Neg Hep B/C at PCP clinic.  Modifiable risk factors for ulcer include alcohol and chewing tobacco use.   -GI consult   -NPO at [place]   -Plan for EGD tomorrow  -Counseled on alcohol and tobacco cessation  -Protonix 40mg BID  -Anemia w/u per PMD OP  -Daily CMP, CBC  -Monitor for fevers    Tobacco use  Nicotine patch         Diet: NPO per Anesthesia Guidelines for Procedure/Surgery Except for: Meds    DVT Prophylaxis: Low Risk/Ambulatory with no VTE prophylaxis indicated  Hernandez Catheter: Not present  Fluids: none  Lines: None     Cardiac Monitoring: None  Code Status: Full Code      Clinically Significant Risk Factors Present on Admission                       # Overweight: Estimated body mass index is 28.87 kg/m  as calculated from the following:    Height as of this encounter: 1.6 m (5' 3\").    Weight as of this encounter: 73.9 kg (163 lb).            Disposition Plan      Expected Discharge Date: 06/22/2023                The patient's care was discussed with the Attending Physicians at morning report.      Sukumar Park MD  Hospitalist Service  [facility]  Securely message " with Alva (more info)  Text page via [facility] Paging/Directory   ______________________________________________________________________    Chief Complaint   Melena    History is obtained from the patient    History of Present Illness   Lanette Subramanian is a 37 year old male who presents with low hemoglobin.  Sent from PCP clinic for transfusion.  Has had dark stools for about the last month.  At the start of symptoms, he was drinking hard liquor in excess, though states he has since stopped due to his symptoms.  Stools have returned to normal over the past week or so.  Denies nausea, vomiting, abdominal pain this time.  Denies reflux symptoms or early satiety.    Reports similar symptoms back in 2020, was found to have a bleeding gastric ulcer at that time.  Denies H. pylori infection.    Denies NSAID use.  Drinks about 1 L of hard liquor per week and has done so for about the last 7 to 8 years.  Does not drink daily.  Has chewed tobacco for about the last 3 years.    States he has had intermittent fevers during this time.  States his forehead feels warm.  No fevers measured at home or at PCP office or here.    States he thinks his skin and sclerae have become yellowed, states last noticed this yesterday.      Past Medical History    No past medical history on file.    Past Surgical History     Review of Systems    The 10 point Review of Systems is negative other than noted in the HPI or here.     Social History   I have reviewed this patient's social history and updated it with pertinent information if needed.  Lives in an apartment in [place] with some friends.  Does not have any family here.  Currently between jobs.  Social History     Tobacco Use     Smoking status: Never     Smokeless tobacco: Never     No past surgical history on file.    Prior to Admission Medications   None        Physical Exam   Vital Signs: Temp: 98.8  F (37.1  C) Temp src: Oral BP: 111/69 Pulse: 72   Resp: 16 SpO2: 99 % O2 Device: None (Room air)     Weight: 163 lbs 0 oz    Constitutional: awake, alert, cooperative, no apparent distress, and appears stated age  Eyes: Lids and lashes normal, pupils equal, round and reactive to light, extra ocular muscles intact, sclera clear, conjunctiva normal  Respiratory: No increased work of breathing, good air exchange, clear to auscultation bilaterally, no crackles or wheezing  Cardiovascular: Normal apical impulse, regular rate and rhythm, normal S1 and S2, no S3 or S4, and no murmur noted  GI: No scars, normal bowel sounds, soft, non-distended, non-tender, no masses palpated, no hepatosplenomegally  Skin: normal skin color, texture, turgor  Musculoskeletal: There is no redness, warmth, or swelling of the joints.  Full range of motion noted.  Neurologic: Awake, alert, oriented to name, place and time.  Cranial nerves II-XII are grossly intact.          Data     I have personally reviewed the following data over the past 24 hrs:    6.0  \   6.1 (LL)   / 358     138 103 16.7 /  138 (H)   3.9 24 1.05 \       ALT: 26 AST: 24 AP: 71 TBILI: 0.3   ALB: 4.4 TOT PROTEIN: 7.9 LIPASE: N/A

## 2023-06-22 NOTE — MEDICATION SCRIBE - ADMISSION MEDICATION HISTORY
Medication Scribe Admission Medication History    Admission medication history is complete. The information provided in this note is only as accurate as the sources available at the time of the update.    Medication reconciliation/reorder completed by provider prior to medication history? No    Information Source(s): Patient via in-person    Pertinent Information: None    Changes made to PTA medication list:    Added: Tylenol    Deleted: None    Changed: None    Medication Affordability:  Not including over the counter (OTC) medications, was there a time in the past 3 months when you did not take your medications as prescribed because of cost?: No    Allergies reviewed with patient and updates made in EHR: yes    Medication History Completed By: Mikki Clancy 6/22/2023 6:00 AM    Prior to Admission medications    Medication Sig Last Dose Taking? Auth Provider Long Term End Date   acetaminophen (TYLENOL) 500 MG tablet Take 1,000 mg by mouth every 6 hours as needed for mild pain or fever 6/21/2023 at 1600 Yes Unknown, Entered By History

## 2023-06-22 NOTE — CONSULTS
"GI CONSULT NOTE      Name: Lanette Subramanian    Medical Record #: [id number]    YOB: 1985    Date: 6/22/2023    CC: We were consulted by Sukumar Park MD to see Lanette Subramanian in regards to GI bleeding.    HPI: This is a 37 year old Mikayla-speaking male with a history of alcohol and tobacco use disorders. He is sen with use of professional [employer] audio .  About 6 weeks ago he reports having bloody stool (red) for two weeks.  After that, stool became black, but for the past month reports his stools have been brown.     Denies abd pain, diarrhea, weight loss, vomiting, heartburn, dysphagia but reports subjective fever.  Is afebrile here.  Denies any NSAID use.    Had GI bleeding in 2020. Apparently this was due to gastric ulcer. This was in [place] He believes he had EGD but is not sure, saying \"they put me to sleep\". Reports infection was diagnosed then. When asked if the infection might have been H pylori, he responds, \"I forgot, I don't know the name\".       Drinks [place] 1L/week. Has been on thiamine for the past year at the advice of his mother.     Past medical history  EtOH and tobacco use disorder.    Family history  Neg for stomach or intestinal cancers.   Neg for bleeding or clotting disorders.    Social history - no children, single.  1L hard EtOH per week.  Tobacco: intermittently - does not purchase cigarettes; might have a cig if out with friends.    Chews betelnut and tobacco (1 tin/wk)    Medications: thiamine daily.  Current Outpatient Medications   Medication Sig Dispense Refill     acetaminophen (TYLENOL) 500 MG tablet Take 1,000 mg by mouth every 6 hours as needed for mild pain or fever          Allergies:  No Known Allergies     REVIEW OF SYSTEMS (ROS): Review of systems is as per HPI.  Remainder of complete review of systems is negative.      PHYSICAL EXAMINATION:      /82   Pulse 72   Temp 97.7  F (36.5  C) (Oral)   Resp 16   Ht 1.6 m (5' 3\")   Wt 73.9 kg (163 lb)   " SpO2 97%   BMI 28.87 kg/m    GEN: Well developed, well nourished 37 year old male in no acute distress.  HEENT: sclera icteric, moist mucous membranes, neck soft and supple.  LYMPH: No cervical lymphadenopathy  PULM: lungs clear to auscultation bilaterally.  CARDIO: Regular rate and rhythm  GI: Non-distended.  Bowel sounds positive.  Soft.  Non-tender to palpation.  No guarding.  RECTAL: Explained rectal examination, obtained permission.  He had 6 skin tag with small internal clot external to the anus, he had 2 palpable nodules on BETHANY which could have been internal hemorrhoids.  Stool brown, no red or black color on withdrawal of gloved finger.  EXT: warm, no lower extremity edema  NEURO: Alert and oriented.  Speech fluid.    PSYCH: Mental status appropriate, mood and affect normal.      LABS and IMAGING  Recent Labs   Lab 06/22/23  0647 06/21/23  2333 06/21/23  1706 06/21/23  1403   WBC 5.3  --  6.0 6.7   RBC 3.91*  --  3.56* 3.75*   HGB 7.3* 7.4* 6.1* 6.5*   HCT 26.9*  --  23.7* 25.3*   MCV 69*  --  67* 68*   MCH 18.7*  --  17.1* 17.3*   MCHC 27.1*  --  25.7* 25.7*   RDW 24.3*  --  22.7* 23.3*     --  358 392      Recent Labs   Lab 06/22/23  0647 06/21/23  1706 06/16/23  1425    138 140   CO2 26 24 24   BUN 15.8 16.7 10.9     Recent Labs   Lab 06/22/23  0647 06/21/23  1706 06/16/23  1425   ALKPHOS 59 71 70   AST 21 24 35   ALT 26 26 32     Lab Results   Component Value Date    INR 1.06 06/16/2023     ASSESSMENT  This is a 37-year-old Mikayla speaking male with alcohol use disorder admitted with anemia.      #Anemia - Pt is not showing any GI bleeding here.  Reports he had blood per rectum approximately 6-8 weeks ago which then progressed to black stool, and then brown stool for the past month.  He has not had change in bowel frequency and does not have any abdominal pain, cramping, weight loss or other GI symptoms.  Rectal examination shows external hemorrhoid and suspected internal hemorrhoids based  on palpation.  He should have colonoscopy for further evaluation and EGD at that time as well given his reported history of gastric ulcer 3 years ago in [facility].  He is not showing any active bleeding at this time and thus procedures could be done as an outpatient.    #Alcohol use disorder.  Drinks 1 L of cognac per week.  LFTs, INR, platelets are normal.  At risk of alcoholic liver disease if he continues to use alcohol.    Patient Active Problem List   Diagnosis     Screening for HIV (human immunodeficiency virus)     Need for hepatitis C screening test     Melena     Epistaxis     Anemia, unspecified type     PLAN  1. Monitor hgb and stools.  Transfuse if Hgb <7.   2. Change to oral PPI once daily.   3. Outpatient colonoscopy and EGD. [facility] will contact pt to arrange.   4. Will place on regular diet.   5. Consider IV iron prior to discharge.    Dispo: potentially home later today if remains stable.     A total of 45 minutes was spent on today's care.  This included chart review, discussion with RNbob and exam of the patient with audio , formulation of assessment and plan, discussion with Dr Cage.     Thank you for asking to consult on this patient.    Reese Biswas PA-C   6/22/2023 11:47 AM  [facility]  [phone]

## 2023-06-23 NOTE — DISCHARGE SUMMARY
"[facility]  Discharge Summary - Medicine & Pediatrics       Date of Admission:  6/21/2023  Date of Discharge:  6/22/2023  5:35 PM  Discharging Provider: Reese Marroquin DO  Discharge Service: Hospitalist Service    Discharge Diagnoses   Principal Problem:    Melena (6/16/2023)  Active Problems:    Anemia, unspecified type (6/21/2023)        Clinically Significant Risk Factors     # Overweight: Estimated body mass index is 28.87 kg/m  as calculated from the following:    Height as of this encounter: 1.6 m (5' 3\").    Weight as of this encounter: 73.9 kg (163 lb).       Follow-ups Needed After Discharge   Follow-up Appointments     Follow-up and recommended labs and tests       Follow up with primary care provider, Ruby Kimbrough, within 3-5 days for   hospital follow- up.  The following labs/tests are recommended: CBC,   recommend further evaluation for hemoglobinopathies as previously   discussed by outpatient provider.    Outpatient follow up with GI for EGD and colonoscopy.            Unresulted Labs Ordered in the Past 30 Days of this Admission     No orders found from 5/22/2023 to 6/22/2023.      These results will be followed up by n/a    Discharge Disposition   Discharged to home  Condition at discharge: stable    Hospital Course   Lanette Subramanian is a 37 year old male admitted on 6/21/2023. He has a history of gastric ulcer and is admitted for likely UGIB.     Hx of Melena  Possible UGIB  Microcytic  anemia  Patient referred to ED by PCP office due to hemoglobin <7. Pt reports history of bleeding gastric ulcer causing similar symptoms in 2020.  Denies H. pylori history.  Describes symptoms of fatigue, dizziness. Hgb 6.1 on presentation. S/p 1 unit pRBCs. Also with reported jaundice and scleral icterus, though none appreciated on exam. Doubt true jaundice given normal bilirubin both here and at PCP clinic. Additionally notes intermittent subjective fevers during this time, though none measured; may " be a red herring given no stigmata of infxn. Neg Hep B/C at PCP clinic. Modifiable risk factors for ulcer include alcohol and chewing tobacco use. He reports no alcohol consumption for the past month.  -GI consulted, last blood per rectum was 6-8 weeks ago. IV iron infusion given per GI recommendation. [facility] office with reach out to assist with scheduling outpatient EGD and colonoscopy.  -Hgb 7.6 on discharge. Pt also with significant improvement in symptoms on day of discharge.     Recommendations:   -Counseled on alcohol and tobacco cessation  -outpatient EGD/colonoscpy   -Protonix  -Anemia w/u per PMD OP, agree with screening for hemoglobinopathies.  -Check CBC as outpatient.         Consultations This Hospital Stay   GASTROENTEROLOGY IP CONSULT    Code Status   Prior       The patient was discussed with Dr. Ben Rosenstein, MD    Reeseaamir Marroquin, DO [place] Service [facility] EMERGENCY [facility]PARTMENT  [address] [address]  Phone: [phone]  ______________________________________________________________________    Physical Exam   Vital Signs: Temp: 97.9  F (36.6  C) Temp src: Oral BP: 109/75 Pulse: 72   Resp: 14 SpO2: 98 %      Weight: 163 lbs 0 oz  Constitutional: awake, alert, cooperative, no apparent distress, and appears stated age  Eyes: sclera clear, conjunctiva normal  Respiratory: No increased work of breathing, good air exchange, clear to auscultation bilaterally, no crackles or wheezing  Cardiovascular: Normal apical impulse, regular rate and rhythm, normal S1 and S2, no S3 or S4, and no murmur noted  GI: No scars, normal bowel sounds, soft, non-distended, non-tender, no masses palpated, no hepatosplenomegally  Skin: normal skin color, texture, turgor  Musculoskeletal: There is no redness, warmth, or swelling of the joints.  Full range of motion noted.  Neurologic: Awake, alert, oriented to name, place and time.  Cranial nerves II-XII are grossly  intact.        Primary Care Physician   Ruby Kimbrough    Discharge Orders      Reason for your hospital stay    Anemia     Follow-up and recommended labs and tests     Follow up with primary care provider, Ruby Kimbrough, within 3-5 days for hospital follow- up.  The following labs/tests are recommended: CBC, recommend further evaluation for hemoglobinopathies as previously discussed by outpatient provider.    Outpatient follow up with GI for EGD and colonoscopy.     Activity    Your activity upon discharge: activity as tolerated     Diet    Follow this diet upon discharge: Orders Placed This Encounter      Regular diet       Significant Results and Procedures   Most Recent 3 CBC's:Recent Labs   Lab Test 06/22/23  1503 06/22/23  0647 06/21/23  2333 06/21/23  1706 06/21/23  1403   WBC  --  5.3  --  6.0 6.7   HGB 7.6* 7.3* 7.4* 6.1* 6.5*   MCV  --  69*  --  67* 68*   PLT  --  318  --  358 392     Most Recent 3 BMP's:Recent Labs   Lab Test 06/22/23  0647 06/21/23  1706 06/16/23  1425    138 140   POTASSIUM 4.2 3.9 4.1   CHLORIDE 105 103 104   CO2 26 24 24   BUN 15.8 16.7 10.9   CR 0.83 1.05 0.76   ANIONGAP 8 11 12   SEDRICK 9.2 9.6 9.3   GLC 95 138* 108*   , No results found for this or any previous visit.    Discharge Medications   Discharge Medication List as of 6/22/2023  5:24 PM      START taking these medications    Details   pantoprazole (PROTONIX) 40 MG EC tablet Take 1 tablet (40 mg) by mouth daily, Disp-30 tablet, R-0, E-Prescribe         CONTINUE these medications which have NOT CHANGED    Details   acetaminophen (TYLENOL) 500 MG tablet Take 1,000 mg by mouth every 6 hours as needed for mild pain or fever, Historical           Allergies   No Known Allergies

## 2023-07-03 ENCOUNTER — OFFICE VISIT (OUTPATIENT)
Dept: FAMILY MEDICINE | Facility: CLINIC | Age: 38
End: 2023-07-03
Payer: MEDICAID

## 2023-07-03 VITALS
WEIGHT: 167 LBS | HEIGHT: 63 IN | HEART RATE: 81 BPM | OXYGEN SATURATION: 97 % | RESPIRATION RATE: 20 BRPM | BODY MASS INDEX: 29.59 KG/M2 | DIASTOLIC BLOOD PRESSURE: 77 MMHG | TEMPERATURE: 98 F | SYSTOLIC BLOOD PRESSURE: 112 MMHG

## 2023-07-03 DIAGNOSIS — Z09 HOSPITAL DISCHARGE FOLLOW-UP: Primary | ICD-10-CM

## 2023-07-03 DIAGNOSIS — K92.1 GASTROINTESTINAL HEMORRHAGE WITH MELENA: ICD-10-CM

## 2023-07-03 DIAGNOSIS — D64.9 SEVERE ANEMIA: ICD-10-CM

## 2023-07-03 LAB
BASOPHILS # BLD AUTO: 0.1 10E3/UL (ref 0–0.2)
BASOPHILS NFR BLD AUTO: 1 %
EOSINOPHIL # BLD AUTO: 0.2 10E3/UL (ref 0–0.7)
EOSINOPHIL NFR BLD AUTO: 4 %
ERYTHROCYTE [DISTWIDTH] IN BLOOD BY AUTOMATED COUNT: 27.8 % (ref 10–15)
FERRITIN SERPL-MCNC: 45 NG/ML (ref 31–409)
FOLATE SERPL-MCNC: 10.4 NG/ML (ref 4.6–34.8)
HCT VFR BLD AUTO: 33.2 % (ref 40–53)
HGB BLD-MCNC: 8.7 G/DL (ref 13.3–17.7)
IMM GRANULOCYTES # BLD: 0 10E3/UL
IMM GRANULOCYTES NFR BLD: 1 %
IRON BINDING CAPACITY (ROCHE): 429 UG/DL (ref 240–430)
IRON SATN MFR SERPL: 3 % (ref 15–46)
IRON SERPL-MCNC: 12 UG/DL (ref 61–157)
LYMPHOCYTES # BLD AUTO: 1.4 10E3/UL (ref 0.8–5.3)
LYMPHOCYTES NFR BLD AUTO: 26 %
MCH RBC QN AUTO: 19 PG (ref 26.5–33)
MCHC RBC AUTO-ENTMCNC: 26.2 G/DL (ref 31.5–36.5)
MCV RBC AUTO: 72 FL (ref 78–100)
MONOCYTES # BLD AUTO: 0.4 10E3/UL (ref 0–1.3)
MONOCYTES NFR BLD AUTO: 8 %
NEUTROPHILS # BLD AUTO: 3.3 10E3/UL (ref 1.6–8.3)
NEUTROPHILS NFR BLD AUTO: 60 %
NRBC # BLD AUTO: 0 10E3/UL
NRBC BLD AUTO-RTO: 0 /100
PLATELET # BLD AUTO: 429 10E3/UL (ref 150–450)
RBC # BLD AUTO: 4.59 10E6/UL (ref 4.4–5.9)
RETICS # AUTO: 0.04 10E6/UL (ref 0.01–0.11)
RETICS/RBC NFR AUTO: 1 % (ref 0.8–2.7)
TRANSFERRIN SERPL-MCNC: 359 MG/DL (ref 200–360)
VIT B12 SERPL-MCNC: 613 PG/ML (ref 232–1245)
WBC # BLD AUTO: 5.4 10E3/UL (ref 4–11)

## 2023-07-03 PROCEDURE — 82746 ASSAY OF FOLIC ACID SERUM: CPT

## 2023-07-03 PROCEDURE — 83540 ASSAY OF IRON: CPT

## 2023-07-03 PROCEDURE — 36415 COLL VENOUS BLD VENIPUNCTURE: CPT

## 2023-07-03 PROCEDURE — 99214 OFFICE O/P EST MOD 30 MIN: CPT | Mod: GC

## 2023-07-03 PROCEDURE — 83021 HEMOGLOBIN CHROMOTOGRAPHY: CPT

## 2023-07-03 PROCEDURE — 85025 COMPLETE CBC W/AUTO DIFF WBC: CPT

## 2023-07-03 PROCEDURE — 82607 VITAMIN B-12: CPT

## 2023-07-03 PROCEDURE — 85045 AUTOMATED RETICULOCYTE COUNT: CPT

## 2023-07-03 PROCEDURE — T1013 SIGN LANG/ORAL INTERPRETER: HCPCS

## 2023-07-03 PROCEDURE — 83020 HEMOGLOBIN ELECTROPHORESIS: CPT

## 2023-07-03 PROCEDURE — 85060 BLOOD SMEAR INTERPRETATION: CPT | Performed by: PATHOLOGY

## 2023-07-03 PROCEDURE — 84466 ASSAY OF TRANSFERRIN: CPT

## 2023-07-03 PROCEDURE — 82728 ASSAY OF FERRITIN: CPT

## 2023-07-03 RX ORDER — POLYETHYLENE GLYCOL 3350 17 G/17G
235 POWDER, FOR SOLUTION ORAL DAILY
Qty: 238 G | Refills: 0 | Status: SHIPPED | OUTPATIENT
Start: 2023-07-03

## 2023-07-03 RX ORDER — BISACODYL 5 MG/1
10 TABLET, DELAYED RELEASE ORAL ONCE
Qty: 1 TABLET | Refills: 0 | Status: SHIPPED | OUTPATIENT
Start: 2023-07-03 | End: 2023-07-03

## 2023-07-03 NOTE — PROGRESS NOTES
Assessment & Plan     Hospital discharge follow-up, moderate complexity  Gastrointestinal hemorrhage with melena  Severe anemia  Patient hospitalized for suspected GI bleed with severe anemia on 6/21 to 6/22. Received 1U of pRBC. Hemoglobin improving. Reviewed previous note and labs. Denies any more melena or other sources of bleeding. 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 Encouraged continued alcohol cessation. Started on Protonix 40 mg daily. Overall felling better, but still fatigued. Further work-up of anemia will be completed today, but likely acute blood loss from GI source.  [facility] follow up on 7/13 for scopes. Went over preparation from [facility] handout with patient due to primary language being Mikayla. He reads a little in English. Prescribed medications needed for procedure and gave address and phone number to [facility] to determine location and time of procedure. Has someone to drive him.    [facility] instructions:  o Need person to drive you to and from appointment.  o 3 days before scope: start low fiber diet  o 1 day before: begin clear liquid diet  - At 12 (day before procedure) take 2 dulcolax tablets.  - Between 4-6 pm (day before procedure): drink miralax 235 g and Gatorade 96oz (no red Gatorade)  - Folate  Serum  - Vitamin B12  - Hemoglobinopathy/Thal [place]  - Transferrin  - Ferritin  - Iron & Iron Binding Capacity  - Lab Blood Morphology Pathologist Review  - Continue pantoprazole 40 mg daily  - bisacodyl (DULCOLAX) 5 MG EC tablet; Take 2 tablets (10 mg) by mouth once for 1 dose  - polyethylene glycol (MIRALAX) 17 GM/Dose powder; Take 235 g by mouth daily      Return in about 2 months (around 9/3/2023) for gollow-up GI bleed.    Patient was staffed with supervising physician, Dr. Burr.    Ruby Kimbrough MD  [facility] EUGENIO Gama is a 37 year old, presenting for the following health issues:  Follow Up (Seen  "in ED) and Medication Reconciliation (Attempt to review, pt unsure of name of med, taking one med)        7/3/2023     3:35 PM   Additional Questions   Roomed by Kayla   Accompanied by patient(self)     HPI     Hospital Follow-up Visit:    Hospital/Nursing Home/IP Rehab Facility: [facility]  Date of Admission: 6/21/2023  Date of Discharge: 6/22/2023  Reason(s) for Admission: Severe anemia and melena    Was your hospitalization related to COVID-19? No   Problems taking medications regularly:  None  Medication changes since discharge: Protonix 40 mg  Problems adhering to non-medication therapy:  None    Summary of hospitalization:  [facility] discharge summary reviewed  Diagnostic Tests/Treatments reviewed.  Follow up needed: hemoglobin and other causes of anemia  Other Healthcare Providers Involved in Patient s Care:        [facility] Procedures scheduled for 7/13. Given list of instructions on preparation to be completed before colonoscopy.    Update since discharge: stable. Still feeling tired. Denies melena, hematochezia, hematuria, nausea, vomiting, chest pain, worsening shortness of breath, and worsening fatigue.  - No alcohol in the last 1.5 months according to patient. States he would drink a bottle of tequila with 1-2 other friends daily.    Plan of care communicated with patient.      Review of Systems   ROS is negative other than what is listed in the HPI.      Objective    /77 (BP Location: Left arm, Patient Position: Sitting, Cuff Size: Adult Regular)   Pulse 81   Temp 98  F (36.7  C) (Oral)   Resp 20   Ht 1.594 m (5' 2.75\")   Wt 75.8 kg (167 lb)   SpO2 97%   BMI 29.82 kg/m    Body mass index is 29.82 kg/m .     Physical Exam   General appearance: alert, in no distress, cooperative  Head: normocephalic, without obvious abnormalities  Ears: hearing grossly intact  Nose: nares normal, no drainage  Lung: clear to auscultation bilaterally, no wheezing, " coughing, or use of accessory muscles  Heart: regular rate and rhythm, S1, S2 normal, no murmur, click, rub, or gallop  Abdomen: soft, non-tender, bowel sounds present in all four quadrants, no masses or organomegaly  Skin: normal color, texture, and turgor  Neurologic: ambulatory, spontaneously moving upper and lower extremities without pain  Psychologic: appropriate mood    Results for orders placed or performed in visit on 07/03/23   Folate  Serum     Status: Normal   Result Value Ref Range    Folic Acid 10.4 4.6 - 34.8 ng/mL   Vitamin B12     Status: Normal   Result Value Ref Range    Vitamin B12 613 232 - 1,245 pg/mL   Transferrin     Status: Normal   Result Value Ref Range    Transferrin 359.0 200.0 - 360.0 mg/dL   Ferritin     Status: Normal   Result Value Ref Range    Ferritin 45 31 - 409 ng/mL   Iron & Iron Binding Capacity     Status: Abnormal   Result Value Ref Range    Iron 12 (L) 61 - 157 ug/dL    Iron Binding Capacity 429 240 - 430 ug/dL    Iron Sat Index 3 (L) 15 - 46 %   CBC with platelets and differential     Status: Abnormal   Result Value Ref Range    WBC Count 5.4 4.0 - 11.0 10e3/uL    RBC Count 4.59 4.40 - 5.90 10e6/uL    Hemoglobin 8.7 (L) 13.3 - 17.7 g/dL    Hematocrit 33.2 (L) 40.0 - 53.0 %    MCV 72 (L) 78 - 100 fL    MCH 19.0 (L) 26.5 - 33.0 pg    MCHC 26.2 (L) 31.5 - 36.5 g/dL    RDW 27.8 (H) 10.0 - 15.0 %    Platelet Count 429 150 - 450 10e3/uL    % Neutrophils 60 %    % Lymphocytes 26 %    % Monocytes 8 %    % Eosinophils 4 %    % Basophils 1 %    % Immature Granulocytes 1 %    NRBCs per 100 WBC 0 <1 /100    Absolute Neutrophils 3.3 1.6 - 8.3 10e3/uL    Absolute Lymphocytes 1.4 0.8 - 5.3 10e3/uL    Absolute Monocytes 0.4 0.0 - 1.3 10e3/uL    Absolute Eosinophils 0.2 0.0 - 0.7 10e3/uL    Absolute Basophils 0.1 0.0 - 0.2 10e3/uL    Absolute Immature Granulocytes 0.0 <=0.4 10e3/uL    Absolute NRBCs 0.0 10e3/uL   Reticulocyte count     Status: Normal   Result Value Ref Range    %  Reticulocyte 1.0 0.8 - 2.7 %    Absolute Reticulocyte 0.045 0.010 - 0.110 10e6/uL   Lab Blood Morphology Pathologist Review     Status: Abnormal (In process)    Narrative    The following orders were created for panel order Lab Blood Morphology Pathologist Review.  Procedure                               Abnormality         Status                     ---------                               -----------         ------                     Bld morphology pathology...[[id number]]                      In process                 CBC with platelets and d...[[id number]]  Abnormal            Final result               Reticulocyte count[[id number]]           Normal              Final result               Morphology Tracking[[id number]]                              In process                   Please view results for these tests on the individual orders.       ----- Service Performed and Documented by Resident or Fellow ------

## 2023-07-03 NOTE — PROGRESS NOTES
Preceptor Attestation:    I discussed the patient with the resident and evaluated the patient in person. I have verified the content of the note, which accurately reflects my assessment of the patient and the plan of care.   Supervising Physician:  Earle Burr MD.

## 2023-07-03 NOTE — PATIENT INSTRUCTIONS
Thank you for coming to see me today in clinic!    Today we discussed:  - Low blood count: doing labs today  Follow-up scope with [facility] on July 13th  Need person to drive you to and from appointment.  3 days before scope: start low fiber diet  1 day before: begin clear liquid diet  At 12 (day before procedure) take 2 dulcolax tablets.  Between 4-6 pm (day before procedure): drink miralax 235 g and Gatorade 96oz (no red Gatorade     [facility]:  [address], [address], [place] [address] [phone]    If you have any further questions, please call the clinic!    Have a wonderful rest of your day!

## 2023-07-06 LAB
PATH REPORT.COMMENTS IMP SPEC: NORMAL
PATH REPORT.COMMENTS IMP SPEC: NORMAL
PATH REPORT.FINAL DX SPEC: NORMAL
PATH REPORT.MICROSCOPIC SPEC OTHER STN: NORMAL
PATH REPORT.RELEVANT HX SPEC: NORMAL

## 2023-07-11 LAB
HEMOGLOBIN A2 QUANTITATION: 2 % (ref 2.2–3.5)
HEMOGLOBIN A: 98 % (ref 94.5–97.8)
HEMOGLOBIN ELECTROPHRESIS: ABNORMAL
HEMOGLOBIN F QUANTITATION: <0.5 % (ref 0–2)

## 2023-07-13 ENCOUNTER — TRANSFERRED RECORDS (OUTPATIENT)
Dept: HEALTH INFORMATION MANAGEMENT | Facility: CLINIC | Age: 38
End: 2023-07-13
Payer: MEDICAID

## 2023-08-08 ENCOUNTER — TRANSFERRED RECORDS (OUTPATIENT)
Dept: HEALTH INFORMATION MANAGEMENT | Facility: CLINIC | Age: 38
End: 2023-08-08
Payer: COMMERCIAL

## 2023-08-16 ENCOUNTER — OFFICE VISIT (OUTPATIENT)
Dept: FAMILY MEDICINE | Facility: CLINIC | Age: 38
End: 2023-08-16
Payer: COMMERCIAL

## 2023-08-16 VITALS
OXYGEN SATURATION: 97 % | BODY MASS INDEX: 29.89 KG/M2 | RESPIRATION RATE: 20 BRPM | DIASTOLIC BLOOD PRESSURE: 78 MMHG | WEIGHT: 167.4 LBS | HEART RATE: 80 BPM | TEMPERATURE: 98.4 F | SYSTOLIC BLOOD PRESSURE: 121 MMHG

## 2023-08-16 DIAGNOSIS — D50.8 OTHER IRON DEFICIENCY ANEMIA: ICD-10-CM

## 2023-08-16 DIAGNOSIS — D64.9 SEVERE ANEMIA: Primary | ICD-10-CM

## 2023-08-16 LAB
DACRYOCYTES BLD QL SMEAR: SLIGHT
ELLIPTOCYTES BLD QL SMEAR: SLIGHT
ERYTHROCYTE [DISTWIDTH] IN BLOOD BY AUTOMATED COUNT: 24.3 % (ref 10–15)
HCT VFR BLD AUTO: 36.5 % (ref 40–53)
HGB BLD-MCNC: 9.6 G/DL (ref 13.3–17.7)
MCH RBC QN AUTO: 17.7 PG (ref 26.5–33)
MCHC RBC AUTO-ENTMCNC: 26.3 G/DL (ref 31.5–36.5)
MCV RBC AUTO: 67 FL (ref 78–100)
PLAT MORPH BLD: ABNORMAL
PLATELET # BLD AUTO: 422 10E3/UL (ref 150–450)
RBC # BLD AUTO: 5.42 10E6/UL (ref 4.4–5.9)
RBC MORPH BLD: ABNORMAL
WBC # BLD AUTO: 5.6 10E3/UL (ref 4–11)

## 2023-08-16 PROCEDURE — 99213 OFFICE O/P EST LOW 20 MIN: CPT | Mod: GC

## 2023-08-16 PROCEDURE — 36415 COLL VENOUS BLD VENIPUNCTURE: CPT

## 2023-08-16 PROCEDURE — 85027 COMPLETE CBC AUTOMATED: CPT

## 2023-08-16 NOTE — LETTER
August 28, 2023      Lanette Subramanian  [address] [place] [address]        Dear ,      Your hemoglobin continues to increase which is good! Please return in 2-3 months for a recheck and your annual physical exam.       Resulted Orders   CBC with platelets   Result Value Ref Range    WBC Count 5.6 4.0 - 11.0 10e3/uL    RBC Count 5.42 4.40 - 5.90 10e6/uL    Hemoglobin 9.6 (L) 13.3 - 17.7 g/dL    Hematocrit 36.5 (L) 40.0 - 53.0 %    MCV 67 (L) 78 - 100 fL    MCH 17.7 (L) 26.5 - 33.0 pg    MCHC 26.3 (L) 31.5 - 36.5 g/dL    RDW 24.3 (H) 10.0 - 15.0 %    Platelet Count 422 150 - 450 10e3/uL   RBC and Platelet Morphology   Result Value Ref Range    Platelet Assessment  Automated Count Confirmed. Platelet morphology is normal.     Automated Count Confirmed. Platelet morphology is normal.    Elliptocytes Slight (A) None Seen    Teardrop Cells Slight (A) None Seen    RBC Morphology Confirmed RBC Indices        If you have any questions or concerns, please call the clinic at the number listed above.       Sincerely,    Ruby Kimbrough MD

## 2023-08-16 NOTE — PROGRESS NOTES
Assessment & Plan     Severe anemia  Hemoglobin continues to improve.  Currently at 9.6.  MCV remains stable.  Previous electrophoresis done on 7/3/2023 shows very slight elevation of hemoglobin A; this was read as a normal hemoglobin electrophoresis pattern.   - CBC with platelets  - RBC and Platelet Morphology  - Follow-up with GI in September  - Reviewed colonoscopy and EGD (7/13/2023) which were both negative for signs and sources of bleeding  - Continue alcohol cessation    Other iron deficiency anemia  Iron on 7/3/2023 are low with remaining low MCV, but thought to be due to acute blood loss.  Ferritin within normal limits at 45.  - Ferrous sulfate (FEROSUL) 325 (65 Fe) MG tablet; take daily or every other day      Patient was staffed with supervising physician, Dr. Bala Donis .    Ruby Kimbrough MD  [facility]    Simin Gama is a 37 year old, presenting for the following health issues:  Follow Up (1 month Follow up  and lab result/)      8/16/2023     1:31 PM   Additional Questions   Roomed by [email or web address]       [facility]     Patient states he is overall doing well.  He denies any fatigue, blood in the urine, blood in the stool, nausea, vomiting.  He states that he remains alcohol free since his previous hospitalization.  He completed a colonoscopy and EGD which were both negative.  He states he has a follow-up with GI in September.      Review of Systems   ROS is negative other than what is listed in the HPI.      Objective    /78   Pulse 80   Temp 98.4  F (36.9  C) (Oral)   Resp 20   Wt 75.9 kg (167 lb 6.4 oz)   SpO2 97%   BMI 29.89 kg/m    Body mass index is 29.89 kg/m .    Physical Exam   General appearance: alert, in no distress, cooperative  Eyes: conjunctivae/corneas clear  Ears: hearing grossly intact  Nose: nares normal, no drainage  Lung: clear to auscultation bilaterally, no wheezing, coughing, or use of accessory muscles  Heart: regular rate and rhythm, S1, S2  normal, no murmur, click, rub, or gallop  Neurologic: Ambulatory  Psychologic: Appropriate mood        ----- Service Performed and Documented by Resident or Fellow ------

## 2023-08-17 RX ORDER — FERROUS SULFATE 325(65) MG
325 TABLET ORAL
Qty: 90 TABLET | Refills: 1 | Status: SHIPPED | OUTPATIENT
Start: 2023-08-17